# Patient Record
Sex: FEMALE | Race: WHITE
[De-identification: names, ages, dates, MRNs, and addresses within clinical notes are randomized per-mention and may not be internally consistent; named-entity substitution may affect disease eponyms.]

---

## 2020-01-07 ENCOUNTER — HOSPITAL ENCOUNTER (OUTPATIENT)
Dept: HOSPITAL 53 - M PLARAD | Age: 80
End: 2020-01-07
Attending: OTOLARYNGOLOGY
Payer: MEDICARE

## 2020-01-07 DIAGNOSIS — C76.0: Primary | ICD-10-CM

## 2020-01-07 PROCEDURE — 78815 PET IMAGE W/CT SKULL-THIGH: CPT

## 2020-01-08 NOTE — REP
PET/CT:

 

HISTORY:   Staging head and neck malignancy.  Hoarseness.

 

COMPARISONS:  No comparison imaging.

 

TECHNIQUE:  59 minutes following the intravenous injection of a 8.12 mCi dose of

F-18 FDG, three-dimensional PET scintigraphy is acquired from the skull vertex to

the proximal thighs. Triplanar noncontrast CT scanning is acquired through the

same anatomic range for attenuation correction, and image registration with scan

parameters optimized to minimize radiation exposure to the patient. PET

scintigraphy and CT datasets were fused and displayed on a workstation with

multiplanar and projection display capability.

 

PET/CT FINDINGS:  There is no abnormal hypermetabolic uptake in the glottic or

supraglottic larynx region.  There is some asymmetric uptake along the lateral

wall of the hypopharynx on the right compared to the left.  Maximum standard

uptake value here is 6.39.  No mass lesion is visible on accompanying CT here and

this may be normal variant skeletal muscle uptake.  Clinical correlation is

advised.  Head and neck soft tissues are otherwise unremarkable.  No abnormal

hypermetabolic uptake is seen in the neck nodes.  There is no discernible

adenopathy.

 

No abnormal hypermetabolic uptake is seen within the thorax.  No abnormal

pulmonary parenchymal uptake is observed.

 

In the abdomen and pelvis,  there is normal  FDG distribution to the liver,

spleen, gastrointestinal tract, and genitourinary tract.  No abnormal abdominal

or pelvic hypermetabolic uptake is appreciated.

 

IMPRESSION:

 

Mildly asymmetric uptake in the lateral wall of the hypopharynx on the right

compared to the left of questionable significance.  No other abnormal

hypermetabolic uptake is appreciated.

 

 

Electronically Signed by

Darien Tong MD 01/08/2020 11:28 A